# Patient Record
Sex: FEMALE | Race: WHITE | NOT HISPANIC OR LATINO | URBAN - METROPOLITAN AREA
[De-identification: names, ages, dates, MRNs, and addresses within clinical notes are randomized per-mention and may not be internally consistent; named-entity substitution may affect disease eponyms.]

---

## 2018-07-24 ENCOUNTER — OFFICE VISIT (OUTPATIENT)
Dept: URBAN - METROPOLITAN AREA CLINIC 32 | Facility: CLINIC | Age: 68
End: 2018-07-24
Payer: COMMERCIAL

## 2018-07-24 DIAGNOSIS — H52.4 PRESBYOPIA: Primary | ICD-10-CM

## 2018-07-24 PROCEDURE — 92014 COMPRE OPH EXAM EST PT 1/>: CPT | Performed by: OPTOMETRIST

## 2018-07-24 PROCEDURE — 92015 DETERMINE REFRACTIVE STATE: CPT | Performed by: OPTOMETRIST

## 2018-07-24 ASSESSMENT — VISUAL ACUITY
OS: 20/100
OD: 20/70

## 2018-07-24 ASSESSMENT — INTRAOCULAR PRESSURE
OS: 12
OD: 12

## 2018-10-16 ENCOUNTER — OFFICE VISIT (OUTPATIENT)
Dept: URBAN - METROPOLITAN AREA CLINIC 32 | Facility: CLINIC | Age: 68
End: 2018-10-16
Payer: COMMERCIAL

## 2018-10-16 DIAGNOSIS — H04.123 DRY EYE SYNDROME: Primary | ICD-10-CM

## 2018-10-16 PROCEDURE — 99213 OFFICE O/P EST LOW 20 MIN: CPT | Performed by: OPTOMETRIST

## 2018-10-16 ASSESSMENT — INTRAOCULAR PRESSURE
OS: 10
OD: 9

## 2018-10-16 NOTE — IMPRESSION/PLAN
Impression: Dry Eye Syndrome: D30.467. Plan: Dry eyes account for the patient's complaints. There is no evidence of permanent changes to the cornea. Explained condition does not have a cure and will need artificial tears for maintenance.

## 2019-01-18 ENCOUNTER — OFFICE VISIT (OUTPATIENT)
Dept: URBAN - METROPOLITAN AREA CLINIC 32 | Facility: CLINIC | Age: 69
End: 2019-01-18
Payer: MEDICARE

## 2019-01-18 PROCEDURE — 99214 OFFICE O/P EST MOD 30 MIN: CPT | Performed by: OPTOMETRIST

## 2019-01-18 ASSESSMENT — INTRAOCULAR PRESSURE
OD: 11
OS: 12

## 2019-01-18 NOTE — IMPRESSION/PLAN
Impression: Nexdtve age-rel mclr degn, bi, adv atrpc w/o sbfvl involv: H35.3133. OU. Condition: established, stable. Plan: Discussed diagnosis in detail with patient. No treatment is required at this time. Use of vitamins has shown to improve the effects of ARMD. Recommend AREDS 2 formula. Wear quality sunglasses and monitor vision at home with [address] [place] [address]. Call the office for an immediate appointment if [facility] worsens. Educational material provided to patient. OCT performed today: no IRF or SRF OU - stable.

## 2019-01-18 NOTE — IMPRESSION/PLAN
Impression: Dry Eye Syndrome: J99.580. Plan: Dry eyes account for the patient's complaints. There is no evidence of permanent changes to the cornea. Explained condition does not have a cure and will need artificial tears for maintenance.

## 2019-03-22 ENCOUNTER — OFFICE VISIT (OUTPATIENT)
Dept: URBAN - METROPOLITAN AREA CLINIC 32 | Facility: CLINIC | Age: 69
End: 2019-03-22
Payer: MEDICARE

## 2019-03-22 PROCEDURE — 99214 OFFICE O/P EST MOD 30 MIN: CPT | Performed by: OPTOMETRIST

## 2019-03-22 ASSESSMENT — INTRAOCULAR PRESSURE
OS: 11
OD: 10

## 2019-03-22 NOTE — IMPRESSION/PLAN
Impression: Dry Eye Syndrome: J11.109. Plan: Dry eyes account for the patient's complaints. There is no evidence of permanent changes to the cornea. Explained condition does not have a cure and will need artificial tears for maintenance.

## 2019-03-22 NOTE — IMPRESSION/PLAN
Impression: Nexdtve age-rel mclr degn, bi, adv atrpc w/o sbfvl involv: H35.3133. OU. Condition: established, stable. Plan: Discussed diagnosis in detail with patient. No treatment is required at this time. Use of vitamins has shown to improve the effects of ARMD. Recommend AREDS 2 formula. Wear quality sunglasses and monitor vision at home with [address] [place] [address]. Call the office for an immediate appointment if [address] worsens. Educational material provided to patient. OCT performed today: no IRF or SRF OU - stable.

## 2019-09-13 ENCOUNTER — OFFICE VISIT (OUTPATIENT)
Dept: URBAN - METROPOLITAN AREA CLINIC 32 | Facility: CLINIC | Age: 69
End: 2019-09-13
Payer: MEDICARE

## 2019-09-13 PROCEDURE — 92134 CPTRZ OPH DX IMG PST SGM RTA: CPT | Performed by: OPTOMETRIST

## 2019-09-13 PROCEDURE — 99214 OFFICE O/P EST MOD 30 MIN: CPT | Performed by: OPTOMETRIST

## 2019-09-13 RX ORDER — CYCLOSPORINE 0.5 MG/ML
0.05 % EMULSION OPHTHALMIC
Qty: 180 | Refills: 3 | Status: INACTIVE
Start: 2019-09-13 | End: 2019-10-11

## 2019-09-13 ASSESSMENT — INTRAOCULAR PRESSURE
OS: 11
OD: 11

## 2019-09-13 NOTE — IMPRESSION/PLAN
Impression: Dry Eye Syndrome: Z18.917. Plan: Dry eyes account for the patient's complaints. There is no evidence of permanent changes to the cornea. Explained condition does not have a cure and will need artificial tears for maintenance.

## 2019-09-13 NOTE — IMPRESSION/PLAN
Impression: Nexdtve age-rel mclr degn, bi, adv atrpc w/o sbfvl involv: H35.3133. OU. Condition: established, stable. Plan: Discussed diagnosis in detail with patient. No treatment is required at this time. Use of vitamins has shown to improve the effects of ARMD. Recommend AREDS 2 formula. Wear quality sunglasses and monitor vision at home with [address] [place] [address]. Call the office for an immediate appointment if [place] worsens. Educational material provided to patient. OCT performed today: no IRF or SRF OU - stable.

## 2019-10-11 ENCOUNTER — OFFICE VISIT (OUTPATIENT)
Dept: URBAN - METROPOLITAN AREA CLINIC 32 | Facility: CLINIC | Age: 69
End: 2019-10-11
Payer: MEDICARE

## 2019-10-11 DIAGNOSIS — H16.223 KERATOCONJUNCTIVITIS SICCA, NOT SPECIFIED AS SJÖGREN'S, BILATERAL: Primary | ICD-10-CM

## 2019-10-11 PROCEDURE — 99214 OFFICE O/P EST MOD 30 MIN: CPT | Performed by: OPTOMETRIST

## 2019-10-11 RX ORDER — CYCLOSPORINE 0.5 MG/ML
0.05 % EMULSION OPHTHALMIC
Qty: 180 | Refills: 3 | Status: INACTIVE
Start: 2019-10-11 | End: 2021-08-09

## 2019-10-11 ASSESSMENT — INTRAOCULAR PRESSURE
OD: 12
OS: 13

## 2019-10-11 NOTE — IMPRESSION/PLAN
Impression: Dry Eye Syndrome: W37.552. Plan: Dry eyes account for the patient's complaints. There is no evidence of permanent changes to the cornea. Explained condition does not have a cure and will need artificial tears for maintenance.

## 2019-10-11 NOTE — IMPRESSION/PLAN
Impression: Nexdtve age-rel mclr degn, bi, adv atrpc w/o sbfvl involv: H35.3133. OU. Condition: established, stable. Plan: Discussed diagnosis in detail with patient. No treatment is required at this time.  Use of vitamins has shown to improve the effects of ARMD. Recommend AREDS 2 formula decreased vision OS refer for eval

## 2019-10-11 NOTE — IMPRESSION/PLAN
Impression: Nexdtve age-rel mclr degn, bi, adv atrpc w/o sbfvl involv: H35.3133. OU. Condition: established, stable. Plan: Discussed diagnosis in detail with patient. No treatment is required at this time. Use of vitamins has shown to improve the effects of ARMD. Recommend AREDS 2 formula. Wear quality sunglasses and monitor vision at home with [address] [place] [address]. Call the office for an immediate appointment if [address] worsens. Educational material provided to patient. OCT performed today: no IRF or SRF OU - stable.

## 2019-11-04 ENCOUNTER — OFFICE VISIT (OUTPATIENT)
Dept: URBAN - METROPOLITAN AREA CLINIC 32 | Facility: CLINIC | Age: 69
End: 2019-11-04
Payer: MEDICARE

## 2019-11-04 PROCEDURE — 99213 OFFICE O/P EST LOW 20 MIN: CPT | Performed by: OPHTHALMOLOGY

## 2019-11-04 PROCEDURE — 92134 CPTRZ OPH DX IMG PST SGM RTA: CPT | Performed by: OPHTHALMOLOGY

## 2019-11-04 ASSESSMENT — INTRAOCULAR PRESSURE
OS: 13
OD: 13

## 2019-11-04 NOTE — IMPRESSION/PLAN
Impression: Nexdtve age-rel mclr degn, bi, adv atrpc without sbfvl invl: H35.0744. OU. Condition: stable. Vision: vision affected. Plan: Discussed diagnosis in detail with patient. Exam OD is stable, OS shows ? speck of heme? [place] Han No treatment is required at this time. Will continue to observe condition and or symptoms,  recommend a follow - up in 6 wks. OCT shows thinning OU. Patient states her lids have been swollen OU.  Recommend a consult with Oculoplastic specialist.

## 2019-12-16 ENCOUNTER — OFFICE VISIT (OUTPATIENT)
Dept: URBAN - METROPOLITAN AREA CLINIC 32 | Facility: CLINIC | Age: 69
End: 2019-12-16
Payer: MEDICARE

## 2019-12-16 PROCEDURE — 92134 CPTRZ OPH DX IMG PST SGM RTA: CPT | Performed by: OPHTHALMOLOGY

## 2019-12-16 PROCEDURE — 99213 OFFICE O/P EST LOW 20 MIN: CPT | Performed by: OPHTHALMOLOGY

## 2019-12-16 ASSESSMENT — INTRAOCULAR PRESSURE
OS: 14
OD: 11

## 2019-12-16 NOTE — IMPRESSION/PLAN
Impression: Nexdtve age-rel mclr degn, bi, adv atrpc without sbfvl invl: H35.7302. OU. Condition: stable. Vision: vision affected. Plan: Discussed diagnosis in detail with patient. No treatment is required at this time based on exam and OCT. Recommend observation for now. Will reassess condition in 3 mos. OCT shows thinning OU with no IRF or SRF OU. Patient states her lids are droopy and interferes with her vision.  Recommend a Lid Consult with Oculoplastic Specialist.

## 2020-03-17 ENCOUNTER — NEW PATIENT (OUTPATIENT)
Dept: URBAN - METROPOLITAN AREA CLINIC 39 | Facility: CLINIC | Age: 70
End: 2020-03-17
Payer: MEDICARE

## 2020-03-17 DIAGNOSIS — H02.831 DERMATOCHALASIS OF RIGHT UPPER LID: Primary | ICD-10-CM

## 2020-03-17 DIAGNOSIS — Z41.1 ENCOUNTER FOR COSMETIC SURGERY: ICD-10-CM

## 2020-03-17 PROCEDURE — 99203 OFFICE O/P NEW LOW 30 MIN: CPT | Performed by: OPHTHALMOLOGY

## 2020-03-17 PROCEDURE — 92285 EXTERNAL OCULAR PHOTOGRAPHY: CPT | Performed by: OPHTHALMOLOGY

## 2020-03-17 PROCEDURE — 92081 LIMITED VISUAL FIELD XM: CPT | Performed by: OPHTHALMOLOGY

## 2020-06-15 ENCOUNTER — APPOINTMENT (OUTPATIENT)
Age: 70
Setting detail: DERMATOLOGY
End: 2020-06-16

## 2020-06-15 DIAGNOSIS — Z41.9 ENCOUNTER FOR PROCEDURE FOR PURPOSES OTHER THAN REMEDYING HEALTH STATE, UNSPECIFIED: ICD-10-CM

## 2020-06-15 PROCEDURE — OTHER COSMETIC CONSULTATION: LASER RESURFACING: OTHER

## 2020-06-15 PROCEDURE — OTHER OTHER: OTHER

## 2020-06-15 ASSESSMENT — LOCATION DETAILED DESCRIPTION DERM
LOCATION DETAILED: LEFT MEDIAL FOREHEAD
LOCATION DETAILED: RIGHT CLAVICULAR SKIN
LOCATION DETAILED: RIGHT UPPER CUTANEOUS LIP
LOCATION DETAILED: LEFT CHIN
LOCATION DETAILED: LEFT INFERIOR LATERAL NECK
LOCATION DETAILED: RIGHT INFERIOR CENTRAL MALAR CHEEK
LOCATION DETAILED: RIGHT INFERIOR ANTERIOR NECK
LOCATION DETAILED: LEFT INFERIOR CENTRAL MALAR CHEEK
LOCATION DETAILED: LEFT CLAVICULAR SKIN
LOCATION DETAILED: NASAL TIP
LOCATION DETAILED: LEFT CENTRAL EYEBROW
LOCATION DETAILED: LEFT UPPER CUTANEOUS LIP
LOCATION DETAILED: RIGHT MEDIAL BUCCAL CHEEK
LOCATION DETAILED: MIDDLE STERNUM
LOCATION DETAILED: LEFT LOWER CUTANEOUS LIP
LOCATION DETAILED: RIGHT CENTRAL EYEBROW

## 2020-06-15 ASSESSMENT — LOCATION ZONE DERM
LOCATION ZONE: NOSE
LOCATION ZONE: FACE
LOCATION ZONE: LIP
LOCATION ZONE: NECK
LOCATION ZONE: TRUNK

## 2020-06-15 ASSESSMENT — LOCATION SIMPLE DESCRIPTION DERM
LOCATION SIMPLE: LEFT FOREHEAD
LOCATION SIMPLE: CHIN
LOCATION SIMPLE: NOSE
LOCATION SIMPLE: RIGHT CHEEK
LOCATION SIMPLE: LEFT CLAVICULAR SKIN
LOCATION SIMPLE: RIGHT LIP
LOCATION SIMPLE: RIGHT ANTERIOR NECK
LOCATION SIMPLE: CHEST
LOCATION SIMPLE: LEFT LIP
LOCATION SIMPLE: RIGHT CLAVICULAR SKIN
LOCATION SIMPLE: LEFT EYEBROW
LOCATION SIMPLE: LEFT ANTERIOR NECK
LOCATION SIMPLE: RIGHT EYEBROW
LOCATION SIMPLE: LEFT CHEEK

## 2020-06-15 NOTE — PROCEDURE: OTHER
Detail Level: Detailed
Other (Free Text): Discussed options for volume loss, wrinkle and loss of elasticity on the chest. Advised patient to see one of our plastic surgeons for a consult to discuss her eyes and possible face lift, as this will give her a more desired result over laser treatments on face and neck. Patient would like to treat chest with laser, scheduled for the following day. bbw
Note Text (......Xxx Chief Complaint.): This diagnosis correlates with the

## 2020-06-16 ENCOUNTER — APPOINTMENT (OUTPATIENT)
Age: 70
Setting detail: DERMATOLOGY
End: 2020-06-16

## 2020-06-16 DIAGNOSIS — Z41.9 ENCOUNTER FOR PROCEDURE FOR PURPOSES OTHER THAN REMEDYING HEALTH STATE, UNSPECIFIED: ICD-10-CM

## 2020-06-16 PROCEDURE — OTHER OTHER (COSMETIC): OTHER

## 2020-06-16 PROCEDURE — OTHER LUMENIS M22 RESURFX: OTHER

## 2020-06-16 ASSESSMENT — LOCATION ZONE DERM
LOCATION ZONE: ARM
LOCATION ZONE: TRUNK
LOCATION ZONE: NECK

## 2020-06-16 ASSESSMENT — LOCATION SIMPLE DESCRIPTION DERM
LOCATION SIMPLE: CHEST
LOCATION SIMPLE: LEFT SHOULDER
LOCATION SIMPLE: RIGHT ANTERIOR NECK
LOCATION SIMPLE: LEFT ANTERIOR NECK

## 2020-06-16 ASSESSMENT — LOCATION DETAILED DESCRIPTION DERM
LOCATION DETAILED: LEFT ANTERIOR SHOULDER
LOCATION DETAILED: LEFT INFERIOR ANTERIOR NECK
LOCATION DETAILED: RIGHT MEDIAL SUPERIOR CHEST
LOCATION DETAILED: LEFT MEDIAL SUPERIOR CHEST
LOCATION DETAILED: RIGHT INFERIOR LATERAL NECK

## 2020-06-16 NOTE — PROCEDURE: LUMENIS M22 RESURFX
Topical Anesthesia?: BLT cream (benzocaine 20%, lidocaine 10%, tetracaine 10%)
Treatment Number: 1
Pre-Procedure: The patient identified areas were cleaned and protective eye wear was worn by all present. The treatment areas were treated using the parameters noted above.
External Cooling: [place] Cryo 5
Post-Procedure: After the procedure, post care was reviewed with the patient.
Density: 350/cm2
Tip Size: 12 mm
Pulse Width (In Milliseconds): 3
Detail Level: Zone
Joules: 32
Tip Shape: Square
Price (Use Numbers Only, No Special Characters Or $): 500.00
Skin Type (Optional): II
External Cooling Fan Speed: 0
Consent: Written consent obtained, risks reviewed including but not limited to crusting, scabbing, blistering, scarring, darker or lighter pigmentary change, bruising, and/or incomplete response.
Severity: Medium
Post-Care Instructions: I reviewed with the patient in detail post-care instructions. Patient should stay away from the sun and wear sun protection until treated areas are fully healed.

## 2020-06-16 NOTE — PROCEDURE: OTHER (COSMETIC)
Other (Free Text): Patient tolerated treatment. \\nWe reviewed post care, staying out of direct sunlight, sunscreen and wearing clothing to cover treated area when outside for extended periods. \\nApplied spf 46 tinted and given a cold compress. \\nFollow up 1 month. bbw
Detail Level: Zone

## 2020-07-14 ENCOUNTER — APPOINTMENT (OUTPATIENT)
Age: 70
Setting detail: DERMATOLOGY
End: 2020-07-15

## 2020-07-14 DIAGNOSIS — Z41.9 ENCOUNTER FOR PROCEDURE FOR PURPOSES OTHER THAN REMEDYING HEALTH STATE, UNSPECIFIED: ICD-10-CM

## 2020-07-14 PROCEDURE — OTHER LUMENIS M22 RESURFX: OTHER

## 2020-07-14 PROCEDURE — OTHER OTHER (COSMETIC): OTHER

## 2020-07-14 ASSESSMENT — LOCATION ZONE DERM
LOCATION ZONE: TRUNK
LOCATION ZONE: NECK

## 2020-07-14 ASSESSMENT — LOCATION DETAILED DESCRIPTION DERM
LOCATION DETAILED: LEFT INFERIOR LATERAL NECK
LOCATION DETAILED: LEFT MEDIAL SUPERIOR CHEST
LOCATION DETAILED: RIGHT INFERIOR LATERAL NECK
LOCATION DETAILED: RIGHT MEDIAL SUPERIOR CHEST
LOCATION DETAILED: RIGHT INFERIOR ANTERIOR NECK

## 2020-07-14 ASSESSMENT — LOCATION SIMPLE DESCRIPTION DERM
LOCATION SIMPLE: RIGHT ANTERIOR NECK
LOCATION SIMPLE: LEFT ANTERIOR NECK
LOCATION SIMPLE: CHEST

## 2020-07-14 NOTE — PROCEDURE: OTHER (COSMETIC)
Detail Level: Zone
Other (Free Text): Patient tolerated treatment\\nWe reviewed post care, staying out of direct sunlight, sunscreen and wearing clothing to cover treated area when outside for extended periods.\\nApplied spf 50 and given cold compress.\\nFollow up 1 month. bbw

## 2020-07-14 NOTE — PROCEDURE: LUMENIS M22 RESURFX
Number Of Passes: 1
Tip Shape: Square
Density: 350/cm2
Pre-Procedure: The patient identified areas were cleaned and protective eye wear was worn by all present. The treatment areas were treated using the parameters noted above.
Post-Care Instructions: I reviewed with the patient in detail post-care instructions. Patient should stay away from the sun and wear sun protection until treated areas are fully healed.
Joules: 42
Skin Type (Optional): II
Pulse Width (In Milliseconds): 3
Consent: Written consent obtained, risks reviewed including but not limited to crusting, scabbing, blistering, scarring, darker or lighter pigmentary change, bruising, and/or incomplete response.
External Cooling Fan Speed: 0
Tip Size: 12 mm
Severity: Severe
Detail Level: Zone
Post-Procedure: After the procedure, post care was reviewed with the patient.
Topical Anesthesia?: BLT cream (benzocaine 20%, lidocaine 10%, tetracaine 10%)
Price (Use Numbers Only, No Special Characters Or $): 500.00
External Cooling: [place] Cryo 5

## 2020-08-04 ENCOUNTER — OFFICE VISIT (OUTPATIENT)
Dept: URBAN - METROPOLITAN AREA CLINIC 32 | Facility: CLINIC | Age: 70
End: 2020-08-04
Payer: MEDICARE

## 2020-08-04 ENCOUNTER — APPOINTMENT (OUTPATIENT)
Age: 70
Setting detail: DERMATOLOGY
End: 2020-08-05

## 2020-08-04 DIAGNOSIS — H35.3133 NEXDTVE AGE-REL MCLR DEGN, BI, ADV ATRPC WITHOUT SBFVL INVL: Primary | ICD-10-CM

## 2020-08-04 DIAGNOSIS — H35.3131 NEXDTVE AGE-RELATED MCLR DEGN, BILATERAL, EARLY DRY STAGE: ICD-10-CM

## 2020-08-04 DIAGNOSIS — Z41.9 ENCOUNTER FOR PROCEDURE FOR PURPOSES OTHER THAN REMEDYING HEALTH STATE, UNSPECIFIED: ICD-10-CM

## 2020-08-04 PROCEDURE — 92134 CPTRZ OPH DX IMG PST SGM RTA: CPT | Performed by: OPTOMETRIST

## 2020-08-04 PROCEDURE — OTHER LUMENIS M22 RESURFX: OTHER

## 2020-08-04 PROCEDURE — 99214 OFFICE O/P EST MOD 30 MIN: CPT | Performed by: OPTOMETRIST

## 2020-08-04 PROCEDURE — OTHER OTHER (COSMETIC): OTHER

## 2020-08-04 ASSESSMENT — LOCATION SIMPLE DESCRIPTION DERM
LOCATION SIMPLE: RIGHT ANTERIOR NECK
LOCATION SIMPLE: LEFT ANTERIOR NECK
LOCATION SIMPLE: CHEST

## 2020-08-04 ASSESSMENT — INTRAOCULAR PRESSURE
OS: 15
OD: 14

## 2020-08-04 ASSESSMENT — LOCATION ZONE DERM
LOCATION ZONE: NECK
LOCATION ZONE: TRUNK

## 2020-08-04 ASSESSMENT — LOCATION DETAILED DESCRIPTION DERM
LOCATION DETAILED: LEFT CLAVICULAR NECK
LOCATION DETAILED: RIGHT INFERIOR LATERAL NECK
LOCATION DETAILED: RIGHT MEDIAL SUPERIOR CHEST
LOCATION DETAILED: LEFT LATERAL SUPERIOR CHEST

## 2020-08-04 NOTE — PROCEDURE: OTHER (COSMETIC)
Detail Level: Zone
Other (Free Text): Patient tolerated treatment.\\nWe reviewed post care staying out of direct sunlight, sunscreen and wearing clothing to cover treated area when outside for extended periods. \\nApplied spf 46 and given a cold compress. \\nFollow up 1 month. bbw

## 2020-08-04 NOTE — PROCEDURE: LUMENIS M22 RESURFX
Pulse Delay (In Milliseconds): 0
Topical Anesthesia?: BLT cream (benzocaine 20%, lidocaine 10%, tetracaine 10%)
Skin Type (Optional): II
Tip Shape: Square
Number Of Passes: 1
Post-Procedure: After the procedure, post care was reviewed with the patient.
Post-Care Instructions: I reviewed with the patient in detail post-care instructions. Patient should stay away from the sun and wear sun protection until treated areas are fully healed.
Pre-Procedure: The patient identified areas were cleaned and protective eye wear was worn by all present. The treatment areas were treated using the parameters noted above.
Price (Use Numbers Only, No Special Characters Or $): 500.00
Consent: Written consent obtained, risks reviewed including but not limited to crusting, scabbing, blistering, scarring, darker or lighter pigmentary change, bruising, and/or incomplete response.
Severity: Severe
Joules: 42
Detail Level: Zone
External Cooling: [place] Cryo 5
Pulse Width (In Milliseconds): 3
Tip Size: 12 mm
Density: 350/cm2

## 2020-08-04 NOTE — IMPRESSION/PLAN
Impression: Nexdtve age-rel mclr degn, bi, adv atrpc without sbfvl invl: H35.6119. OU. Condition: stable. Vision: vision affected. Plan: Discussed diagnosis in detail with patient. No treatment is required at this time based on exam and OCT. Recommend observation for now. Will reassess condition in 3 mos. OCT appears stable OU with no IRF or SRF OU. RTC earlier if vision decreases, Patient states her lids are droopy and interferes with her vision

## 2020-08-13 ENCOUNTER — OFFICE VISIT (OUTPATIENT)
Dept: URBAN - METROPOLITAN AREA CLINIC 32 | Facility: CLINIC | Age: 70
End: 2020-08-13
Payer: MEDICARE

## 2020-08-13 DIAGNOSIS — H02.832 DERMATOCHALASIS OF RIGHT LOWER EYELID: Chronic | ICD-10-CM

## 2020-08-13 DIAGNOSIS — H02.835 DERMATOCHALASIS OF LEFT LOWER EYELID: Chronic | ICD-10-CM

## 2020-08-13 DIAGNOSIS — H02.834 DERMATOCHALASIS OF LEFT UPPER EYELID: Chronic | ICD-10-CM

## 2020-08-13 PROCEDURE — 92285 EXTERNAL OCULAR PHOTOGRAPHY: CPT | Performed by: OPHTHALMOLOGY

## 2020-08-13 PROCEDURE — 92012 INTRM OPH EXAM EST PATIENT: CPT | Performed by: OPHTHALMOLOGY

## 2020-08-13 ASSESSMENT — INTRAOCULAR PRESSURE
OD: 14
OS: 14

## 2020-08-13 NOTE — IMPRESSION/PLAN
Impression: Dermatochalasis of right upper eyelid: H02.831. OD. Condition: established, worsening. Symptoms: may improve with surgery. Vision: vision threatening. Plan: Diagnosis discussed in detail. Treatment options reviewed. Discussed risks, benefits and alternatives to surgery. Patient elects to have surgical treatment. Check VF taped and untaped to assess affect on vision. Recommend bilateral upper lid blepharoplasty if medically necessary. RL2, avoid aspirin and ibuprofen 7 days prior to surgery.

## 2020-08-13 NOTE — IMPRESSION/PLAN
Impression: Dermatochalasis of left lower eyelid: H02.835. OS. Plan: Discussion, surgery orders, risk level and pre-op instructions listed in plan #1.

## 2020-08-13 NOTE — IMPRESSION/PLAN
Impression: Dermatochalasis of right lower eyelid: H02.832. OD. Condition: new problem addtl w/u needed. Plan: Discussed diagnosis in detail with patient. Discussed treatment options with patient. Surgical treatment is recommended. Surgical risks and benefits were discussed, explained and understood by patient. Patient elects to have surgery. Recommend cosmetic lower eyelid blepharoplasty at time of upper eyelid surgery. Risk level and preop instructions listed in plan #1.

## 2020-08-25 ENCOUNTER — TESTING ONLY (OUTPATIENT)
Dept: URBAN - METROPOLITAN AREA CLINIC 32 | Facility: CLINIC | Age: 70
End: 2020-08-25
Payer: MEDICARE

## 2020-08-25 PROCEDURE — 92081 LIMITED VISUAL FIELD XM: CPT | Performed by: OPHTHALMOLOGY

## 2020-10-09 NOTE — ASSESSMENT/PLAN
Impression: Visual Field - OD: Good-superior 30 degrees blocked by lid, improves with lid taped up; OS: Good-superior 30 degrees blocked by lid, improves with lid taped up Plan: Dermatochalasis is visually significant, surgery is medically indicated to improve peripheral vision.  DG

## 2020-11-16 ENCOUNTER — OFFICE VISIT (OUTPATIENT)
Dept: URBAN - METROPOLITAN AREA CLINIC 32 | Facility: CLINIC | Age: 70
End: 2020-11-16
Payer: MEDICARE

## 2020-11-16 PROCEDURE — 92134 CPTRZ OPH DX IMG PST SGM RTA: CPT | Performed by: OPHTHALMOLOGY

## 2020-11-16 PROCEDURE — 99213 OFFICE O/P EST LOW 20 MIN: CPT | Performed by: OPHTHALMOLOGY

## 2020-11-16 ASSESSMENT — INTRAOCULAR PRESSURE
OD: 13
OS: 14

## 2020-11-16 NOTE — IMPRESSION/PLAN
Impression: Nexdtve age-rel mclr degn, bi, adv atrpc without sbfvl invl: H35.5843. OU. Condition: stable. Vision: vision affected. Plan: Discussed diagnosis in detail with patient. No treatment is required at this time based on exam and OCT. Recommend to continue eye care with Dr Ayush Currie and follow -= up with him in 9 mos. Advise to continue taking the eye vitamins - AREDS 2 formula. OCT shows no IRF or SRF OU.

## 2020-12-04 ENCOUNTER — SURGERY (OUTPATIENT)
Dept: URBAN - METROPOLITAN AREA SURGERY 11 | Facility: SURGERY | Age: 70
End: 2020-12-04
Payer: MEDICARE

## 2020-12-17 ENCOUNTER — POST-OPERATIVE VISIT (OUTPATIENT)
Dept: URBAN - METROPOLITAN AREA CLINIC 32 | Facility: CLINIC | Age: 70
End: 2020-12-17
Payer: MEDICARE

## 2020-12-17 DIAGNOSIS — Z48.89 ENCOUNTER FOR OTHER SPECIFIED SURGICAL AFTERCARE: Primary | ICD-10-CM

## 2020-12-17 PROCEDURE — 99024 POSTOP FOLLOW-UP VISIT: CPT | Performed by: OPHTHALMOLOGY

## 2020-12-17 ASSESSMENT — INTRAOCULAR PRESSURE
OS: 9
OD: 13

## 2020-12-17 NOTE — IMPRESSION/PLAN
Impression: S/P Blepharoplasty (upper) OU - 13 Days. Encounter for other specified surgical aftercare  Z48.89. Excellent post op course   Post operative instructions reviewed - Condition is improving - Plan: final photo today --Continue garcía QHS to BUL--Advised patient to use artificial tears for comfort.

## 2021-08-09 ENCOUNTER — OFFICE VISIT (OUTPATIENT)
Dept: URBAN - METROPOLITAN AREA CLINIC 32 | Facility: CLINIC | Age: 71
End: 2021-08-09
Payer: MEDICARE

## 2021-08-09 PROCEDURE — 92134 CPTRZ OPH DX IMG PST SGM RTA: CPT | Performed by: OPTOMETRIST

## 2021-08-09 PROCEDURE — 99214 OFFICE O/P EST MOD 30 MIN: CPT | Performed by: OPTOMETRIST

## 2021-08-09 ASSESSMENT — KERATOMETRY
OS: 43.13
OD: 23.88

## 2021-08-09 ASSESSMENT — INTRAOCULAR PRESSURE
OS: 13
OD: 13

## 2021-08-09 NOTE — IMPRESSION/PLAN
Impression: Nonexudative age-related macular degeneration, bilateral, advanced atrophic without subfoveal involvement: H35.3133. Plan: Macular degeneration, wet type, appears progressive. MAC OCT preformed and reviewed with patient. Monitor.

## 2022-07-21 ENCOUNTER — OFFICE VISIT (OUTPATIENT)
Dept: URBAN - METROPOLITAN AREA CLINIC 32 | Facility: CLINIC | Age: 72
End: 2022-07-21
Payer: MEDICARE

## 2022-07-21 DIAGNOSIS — H35.3133 NONEXUDATIVE AGE-RELATED MACULAR DEGENERATION, BILATERAL, ADVANCED ATROPHIC WITHOUT SUBFOVEAL INVOLVEMENT: Primary | ICD-10-CM

## 2022-07-21 DIAGNOSIS — H52.4 PRESBYOPIA: ICD-10-CM

## 2022-07-21 PROCEDURE — 99214 OFFICE O/P EST MOD 30 MIN: CPT | Performed by: OPTOMETRIST

## 2022-07-21 ASSESSMENT — INTRAOCULAR PRESSURE
OD: 16
OS: 15

## 2022-07-21 ASSESSMENT — VISUAL ACUITY
OS: 20/60
OD: 20/50

## 2022-07-21 NOTE — IMPRESSION/PLAN
Impression: Nonexudative age-related macular degeneration, bilateral, advanced atrophic without subfoveal involvement: H35.1933. Plan: Macular degeneration, wet type, appears progressive. MAC OCT preformed and reviewed with patient. Patient notes decline in vision.  Recommend consult with retina specialist.

## 2022-07-25 ENCOUNTER — OFFICE VISIT (OUTPATIENT)
Dept: URBAN - METROPOLITAN AREA CLINIC 32 | Facility: CLINIC | Age: 72
End: 2022-07-25
Payer: MEDICARE

## 2022-07-25 DIAGNOSIS — H35.3133 BILATERAL NONEXUDATIVE AGE-RELATED MACULAR DEGENERATION, ADVANCED ATROPHIC W/O SUBFOVEAL INVOLVEMENT: Primary | ICD-10-CM

## 2022-07-25 PROCEDURE — 99214 OFFICE O/P EST MOD 30 MIN: CPT | Performed by: OPHTHALMOLOGY

## 2022-07-25 PROCEDURE — 92134 CPTRZ OPH DX IMG PST SGM RTA: CPT | Performed by: OPHTHALMOLOGY

## 2022-07-25 ASSESSMENT — INTRAOCULAR PRESSURE
OD: 9
OS: 11

## 2022-07-25 NOTE — IMPRESSION/PLAN
Impression: Bilateral nonexudative age-related macular degeneration, advanced atrophic w/o subfoveal involvement: H35.4853. Bilateral. Condition: established, stable. Vision: vision affected. Plan: Discussed diagnosis in detail with patient. No treatment is required at this time. Use of vitamins has shown to improve the effects of ARMD. Recommend for patient to continue AREDS 2 formula. Wear quality sunglasses and monitor vision at home with [address]. Call the office for an immediate appointment if [address] worsens. OCT performed today: thinning cw GA, no leakage. RSODM - Pt may see Dr Dami Fong for LVE as desires.

## 2022-08-10 ENCOUNTER — OFFICE VISIT (OUTPATIENT)
Dept: URBAN - METROPOLITAN AREA CLINIC 32 | Facility: CLINIC | Age: 72
End: 2022-08-10
Payer: MEDICARE

## 2022-08-10 DIAGNOSIS — H35.3133 BILATERAL NONEXUDATIVE AGE-RELATED MACULAR DEGENERATION, ADVANCED ATROPHIC W/O SUBFOVEAL INVOLVEMENT: Primary | ICD-10-CM

## 2022-08-10 DIAGNOSIS — H54.50 LOW VISION, ONE EYE, UNSPECIFIED EYE: ICD-10-CM

## 2022-08-10 PROCEDURE — 99213 OFFICE O/P EST LOW 20 MIN: CPT | Performed by: OPTOMETRIST

## 2022-08-10 ASSESSMENT — INTRAOCULAR PRESSURE
OD: 10
OS: 10

## 2022-08-10 ASSESSMENT — VISUAL ACUITY
OS: 20/60
OD: 20/50

## 2022-08-10 ASSESSMENT — KERATOMETRY
OS: 43.00
OD: 42.88

## 2022-08-10 NOTE — IMPRESSION/PLAN
Impression: Bilateral nonexudative age-related macular degeneration, advanced atrophic w/o subfoveal involvement: H35.3133. Bilateral. Condition: established, stable. Vision: vision affected. Plan: Discussed diagnosis in detail with patient. No treatment is required at this time.  Use of vitamins has shown to improve the effects of ARMD. Recommend 6 month follow up with MAC-OCT

## 2022-08-10 NOTE — IMPRESSION/PLAN
Impression: Low vision, one eye, unspecified eye: H54.50. Plan: No changes need to be made to glasses rx given on 07/21/2022. Recommend: Cari 4x Hand Magnifier, Cari Smart [employer], KEERTHI Lights(often available at [employer] or [employer]), Large TV 65'' to 82,'' Audible Books, MaxTV/Max Detail Discussed: ORCAM, iPhone/iPad, Audible Assistants([employer] July/Google Home)

## 2023-02-14 ENCOUNTER — OFFICE VISIT (OUTPATIENT)
Dept: URBAN - METROPOLITAN AREA CLINIC 32 | Facility: CLINIC | Age: 73
End: 2023-02-14
Payer: MEDICARE

## 2023-02-14 DIAGNOSIS — H54.50 LOW VISION, ONE EYE, UNSPECIFIED EYE: Primary | ICD-10-CM

## 2023-02-14 DIAGNOSIS — H35.3133 BILATERAL NONEXUDATIVE AGE-RELATED MACULAR DEGENERATION, ADVANCED ATROPHIC W/O SUBFOVEAL INVOLVEMENT: ICD-10-CM

## 2023-02-14 PROCEDURE — 99214 OFFICE O/P EST MOD 30 MIN: CPT | Performed by: OPTOMETRIST

## 2023-02-14 ASSESSMENT — INTRAOCULAR PRESSURE
OD: 12
OS: 12

## 2023-02-14 ASSESSMENT — VISUAL ACUITY
OS: 20/70
OD: 20/50

## 2023-02-14 ASSESSMENT — KERATOMETRY
OD: 41.88
OS: 43.00

## 2023-02-14 NOTE — IMPRESSION/PLAN
Impression: Low vision, one eye, unspecified eye: H54.50. Plan: No changes need to be made to glasses rx given on 07/21/2022. Recommend: Cari 4x Hand Magnifier, Cari Smart [employer], KEERTHI Lights(often available at [employer] or [employer]), Large TV 65'' to 82,'' Audible Books, MaxTV/Max Detail Discussed: ORCAM, iPhone/iPad, Audible Assistants([employer] July/Google Home)

## 2023-02-14 NOTE — IMPRESSION/PLAN
Impression: Bilateral nonexudative age-related macular degeneration, advanced atrophic w/o subfoveal involvement: H35.3133. Bilateral. Condition: established, stable. Vision: vision affected. Plan: Discussed diagnosis in detail with patient. No treatment is required at this time. Use of vitamins has shown to improve the effects of ARMD. Sent in [facility] vitamins.  Follow up in 4 months with MAC-OCT

## 2023-06-13 ENCOUNTER — OFFICE VISIT (OUTPATIENT)
Dept: URBAN - METROPOLITAN AREA CLINIC 32 | Facility: CLINIC | Age: 73
End: 2023-06-13
Payer: MEDICARE

## 2023-06-13 DIAGNOSIS — H35.3133 BILATERAL NONEXUDATIVE AGE-RELATED MACULAR DEGENERATION, ADVANCED ATROPHIC W/O SUBFOVEAL INVOLVEMENT: Primary | ICD-10-CM

## 2023-06-13 PROCEDURE — 99214 OFFICE O/P EST MOD 30 MIN: CPT | Performed by: OPTOMETRIST

## 2023-06-13 PROCEDURE — 92134 CPTRZ OPH DX IMG PST SGM RTA: CPT | Performed by: OPTOMETRIST

## 2023-06-13 ASSESSMENT — VISUAL ACUITY
OS: 20/80
OD: 20/70

## 2023-06-13 ASSESSMENT — INTRAOCULAR PRESSURE
OS: 13
OD: 13

## 2023-06-13 NOTE — IMPRESSION/PLAN
Impression: Bilateral nonexudative age-related macular degeneration, advanced atrophic w/o subfoveal involvement: H35.3133 Bilateral. Plan: Macular degeneration, dry type with stable vision. MAC OCT preformed and reviewed with patient. Will continue to monitor vision and the patient has been instructed to call with any vision changes. Sent in [employer]. Refer to retina specialist for possible GA injection.

## 2023-06-26 ENCOUNTER — OFFICE VISIT (OUTPATIENT)
Dept: URBAN - METROPOLITAN AREA CLINIC 33 | Facility: CLINIC | Age: 73
End: 2023-06-26
Payer: MEDICARE

## 2023-06-26 DIAGNOSIS — H35.3134 NEXDTVE AGE-REL MCLR DEGN, BI, ADV ATRPC WITH SUBFOVEAL INVL: Primary | ICD-10-CM

## 2023-06-26 PROCEDURE — 92134 CPTRZ OPH DX IMG PST SGM RTA: CPT | Performed by: OPHTHALMOLOGY

## 2023-06-26 PROCEDURE — 99213 OFFICE O/P EST LOW 20 MIN: CPT | Performed by: OPHTHALMOLOGY

## 2023-06-26 ASSESSMENT — INTRAOCULAR PRESSURE
OS: 11
OD: 11

## 2023-06-26 NOTE — IMPRESSION/PLAN
Impression: Nexdtve age-rel mclr degn, bi, adv atrpc with subfoveal invl: H35.4593. Bilateral. Condition: new problem addtl w/u needed. Vision: vision affected. Plan: Discussed diagnosis in detail with patient. Exam shows macular changes associated with dry ARMD. Informed patient that a new drug has just been approved by the FDA for treatment of dry ARMD (Intravitreal injection). Treatment will help stabilize  the dry macular changes and stabilize or slow down reduction in vision. Based on findings patient is a candidate for this new procedure. Recommend Intravitreal Injection Treatment BOTH EYES with SYFOVRE. Explained that this does not cure macular degeneration and may not improve her vision. Discussed the risks and benefits of tx. All questions answered. Patient elects to proceed with recommendation. OCT shows thinning c/w GA OU and Optos FAF shows large area of hypofluorescense c/w GA OU.

## 2023-07-24 ENCOUNTER — OFFICE VISIT (OUTPATIENT)
Dept: URBAN - METROPOLITAN AREA CLINIC 33 | Facility: CLINIC | Age: 73
End: 2023-07-24
Payer: MEDICARE

## 2023-07-24 DIAGNOSIS — H35.3134 NEXDTVE AGE-REL MCLR DEGN, BI, ADV ATRPC WITH SUBFOVEAL INVL: Primary | ICD-10-CM

## 2023-07-24 PROCEDURE — 99213 OFFICE O/P EST LOW 20 MIN: CPT | Performed by: OPHTHALMOLOGY

## 2024-03-04 ENCOUNTER — OFFICE VISIT (OUTPATIENT)
Dept: URBAN - METROPOLITAN AREA CLINIC 32 | Facility: CLINIC | Age: 74
End: 2024-03-04
Payer: MEDICARE

## 2024-03-04 DIAGNOSIS — H35.3134 NEXDTVE AGE-REL MCLR DEGN, BI, ADV ATRPC WITH SUBFOVEAL INVL: Primary | ICD-10-CM

## 2024-03-04 PROCEDURE — 99214 OFFICE O/P EST MOD 30 MIN: CPT | Performed by: OPTOMETRIST

## 2024-03-04 ASSESSMENT — INTRAOCULAR PRESSURE
OD: 9
OS: 10

## 2024-04-11 ENCOUNTER — OFFICE VISIT (OUTPATIENT)
Dept: URBAN - METROPOLITAN AREA CLINIC 32 | Facility: CLINIC | Age: 74
End: 2024-04-11
Payer: MEDICARE

## 2024-04-11 DIAGNOSIS — H52.4 PRESBYOPIA: ICD-10-CM

## 2024-04-11 DIAGNOSIS — H35.3134 NEXDTVE AGE-REL MCLR DEGN, BI, ADV ATRPC WITH SUBFOVEAL INVL: Primary | ICD-10-CM

## 2024-04-11 PROCEDURE — 92133 CPTRZD OPH DX IMG PST SGM ON: CPT | Performed by: OPTOMETRIST

## 2024-04-11 PROCEDURE — 99214 OFFICE O/P EST MOD 30 MIN: CPT | Performed by: OPTOMETRIST

## 2024-04-11 PROCEDURE — 92083 EXTENDED VISUAL FIELD XM: CPT | Performed by: OPTOMETRIST

## 2024-04-11 PROCEDURE — 76514 ECHO EXAM OF EYE THICKNESS: CPT | Performed by: OPTOMETRIST

## 2024-04-11 ASSESSMENT — INTRAOCULAR PRESSURE
OS: 18
OD: 17

## 2024-04-11 ASSESSMENT — VISUAL ACUITY
OD: 20/100
OS: 20/60

## 2024-07-12 ENCOUNTER — OFFICE VISIT (OUTPATIENT)
Dept: URBAN - METROPOLITAN AREA CLINIC 32 | Facility: CLINIC | Age: 74
End: 2024-07-12
Payer: MEDICARE

## 2024-07-12 DIAGNOSIS — H35.3134 NEXDTVE AGE-REL MCLR DEGN, BI, ADV ATRPC WITH SUBFOVEAL INVL: Primary | ICD-10-CM

## 2024-07-12 DIAGNOSIS — H04.123 DRY EYE SYNDROME: ICD-10-CM

## 2024-07-12 PROCEDURE — 99214 OFFICE O/P EST MOD 30 MIN: CPT | Performed by: OPTOMETRIST

## 2024-07-12 PROCEDURE — 92134 CPTRZ OPH DX IMG PST SGM RTA: CPT | Performed by: OPTOMETRIST

## 2024-07-12 ASSESSMENT — INTRAOCULAR PRESSURE
OD: 14
OS: 15

## 2025-01-16 ENCOUNTER — OFFICE VISIT (OUTPATIENT)
Dept: URBAN - METROPOLITAN AREA CLINIC 32 | Facility: CLINIC | Age: 75
End: 2025-01-16
Payer: MEDICARE

## 2025-01-16 DIAGNOSIS — H35.3134 NEXDTVE AGE-REL MCLR DEGN, BI, ADV ATRPC WITH SUBFOVEAL INVL: Primary | ICD-10-CM

## 2025-01-16 PROCEDURE — 99214 OFFICE O/P EST MOD 30 MIN: CPT | Performed by: OPTOMETRIST

## 2025-01-16 PROCEDURE — 92134 CPTRZ OPH DX IMG PST SGM RTA: CPT | Performed by: OPTOMETRIST

## 2025-01-16 ASSESSMENT — INTRAOCULAR PRESSURE
OD: 13
OS: 14

## 2025-05-15 ENCOUNTER — OFFICE VISIT (OUTPATIENT)
Dept: URBAN - METROPOLITAN AREA CLINIC 23 | Facility: CLINIC | Age: 75
End: 2025-05-15
Payer: MEDICARE

## 2025-05-15 DIAGNOSIS — H35.3134 NEXDTVE AGE-REL MCLR DEGN, BI, ADV ATRPC WITH SUBFOVEAL INVL: Primary | ICD-10-CM

## 2025-05-15 PROCEDURE — 99213 OFFICE O/P EST LOW 20 MIN: CPT | Performed by: OPHTHALMOLOGY

## 2025-05-15 PROCEDURE — 92134 CPTRZ OPH DX IMG PST SGM RTA: CPT | Performed by: OPHTHALMOLOGY

## 2025-05-15 ASSESSMENT — INTRAOCULAR PRESSURE
OD: 13
OS: 13